# Patient Record
(demographics unavailable — no encounter records)

---

## 2024-12-17 NOTE — PHYSICAL EXAM
[JVD] : no jugular venous distention  [Normal Breath Sounds] : Normal breath sounds [Normal Rate and Rhythm] : normal rate and rhythm [2+] : left 2+ [Ankle Swelling (On Exam)] : present [Ankle Swelling On The Left] : of the left ankle [Ankle Swelling On The Right] : mild [Varicose Veins Of Lower Extremities] : present [Varicose Veins Of The Left Leg] : of the left leg [] : of the left leg [Ankle Swelling Bilaterally] : severe [Abdomen Tenderness] : ~T ~M No abdominal tenderness [No Rash or Lesion] : No rash or lesion [Skin Ulcer] : ulcer [Alert] : alert [de-identified] : Appears well

## 2024-12-17 NOTE — ASSESSMENT
[FreeTextEntry1] :   Patient has intact pulses in both legs without evidence of arterial insufficiency.  Duplex demonstrates severe venous insufficiency of left greater saphenous vein.  Patient with severe venous insufficiency that is now interfering with (his) daily life and refractory to conservative management.  There is varicosities of the left ankle.  Treatment is indicated now for non-cosmetic reasons for symptomatic venous reflux disease. Recommend radiofrequency ablation of the (left) greater saphenous vein with stab phlebectomy.  The risks and benefits of endovenous ablation of saphenous vein versus conservative treatment was discussed with the patient. Patient chooses to proceed with the procedure. Treatment plan to be scheduled.

## 2024-12-17 NOTE — HISTORY OF PRESENT ILLNESS
[FreeTextEntry1] : 48-year-old gentleman with no significant medical history has a long history of lower extremity varicose veins.  Several years ago patient was seen by a surgeon and it was recommended he undergo surgery but instead opted for compression stockings.  Patient has noted over the past several months the development of an ulcer above the left ankle.  He is also noted increased varicosities.  He continues to wear stockings and he presents to the office for evaluation.

## 2025-01-02 NOTE — HISTORY OF PRESENT ILLNESS
[FreeTextEntry1] : alert and oriented accompanied by friend Chuck [phone] no medications taken this morning  [FreeTextEntry5] : yesterday 7pm [FreeTextEntry6] : Dr Guzmán

## 2025-01-02 NOTE — HISTORY OF PRESENT ILLNESS
[FreeTextEntry1] : alert and oriented accompanied by friend Chuck [phone] no medications taken this morning  [FreeTextEntry5] : yesterday 7pm [FreeTextEntry6] : Dr Guzmán

## 2025-01-02 NOTE — PAST MEDICAL HISTORY
[Increasing age ( >40 years old)] : Increasing age ( >40 years old) [Varicose Veins] : Varicose Veins [No therapy indicated for cases scheduled for less than one hour] : No therapy indicated for cases scheduled for less than one hour. [FreeTextEntry1] : Malignant Hyperthermia Screening Tool and Risk of Bleeding Assessment  Mr. GAYATRI ALEX denies family history of unexpected death following Anesthesia or Exercise. Denies Family history of Malignant Hyperthermia, Muscle or Neuromuscular disorder and High Temperature following exercise.  Mr. GAYATRI ALEX denies history of Muscle Spasm, Dark or Chocolate - Colored urine and Unanticipated fever immediately following anesthesia or serious exercise.  Mr. ALEX also denies bleeding tendencies/ Risks of Bleeding.

## 2025-01-02 NOTE — PROCEDURE
[D/C IV on discharge] : D/C IV on discharge [Resume diet] : resume diet [Dressing checked for bleeding] : Dressing checked for bleeding [Vital signs on admission the q 15 mins x2] : Vital signs on admission the q 15 mins x2 [FreeTextEntry1] : left leg radiofrequency ablation and STABS

## 2025-01-07 NOTE — REASON FOR VISIT
[de-identified] : Left leg ablation and phlebectomy [de-identified] : Patient is status post left leg ablation and phlebectomy.  He is doing well.  No complaints.  He underwent a duplex study which shows no DVT however there is EHIT 2 which will not require intervention.  Patient to follow-up in 1 month.  He will continue Motrin or Advil as needed.